# Patient Record
Sex: FEMALE | Race: WHITE | NOT HISPANIC OR LATINO | URBAN - METROPOLITAN AREA
[De-identification: names, ages, dates, MRNs, and addresses within clinical notes are randomized per-mention and may not be internally consistent; named-entity substitution may affect disease eponyms.]

---

## 2023-05-22 ENCOUNTER — OFFICE VISIT (OUTPATIENT)
Dept: OBSTETRICS AND GYNECOLOGY | Facility: CLINIC | Age: 43
End: 2023-05-22
Payer: COMMERCIAL

## 2023-05-22 VITALS — WEIGHT: 179 LBS | DIASTOLIC BLOOD PRESSURE: 87 MMHG | SYSTOLIC BLOOD PRESSURE: 134 MMHG

## 2023-05-22 DIAGNOSIS — Z01.419 WELL WOMAN EXAM WITH ROUTINE GYNECOLOGICAL EXAM: Primary | ICD-10-CM

## 2023-05-22 PROCEDURE — 99999 PR PBB SHADOW E&M-NEW PATIENT-LVL III: ICD-10-PCS | Mod: PBBFAC,,, | Performed by: OBSTETRICS & GYNECOLOGY

## 2023-05-22 PROCEDURE — 88175 CYTOPATH C/V AUTO FLUID REDO: CPT | Performed by: OBSTETRICS & GYNECOLOGY

## 2023-05-22 PROCEDURE — 99396 PREV VISIT EST AGE 40-64: CPT | Mod: S$GLB,,, | Performed by: OBSTETRICS & GYNECOLOGY

## 2023-05-22 PROCEDURE — 99396 PR PREVENTIVE VISIT,EST,40-64: ICD-10-PCS | Mod: S$GLB,,, | Performed by: OBSTETRICS & GYNECOLOGY

## 2023-05-22 PROCEDURE — 99999 PR PBB SHADOW E&M-NEW PATIENT-LVL III: CPT | Mod: PBBFAC,,, | Performed by: OBSTETRICS & GYNECOLOGY

## 2023-05-22 RX ORDER — TIRZEPATIDE 7.5 MG/.5ML
INJECTION, SOLUTION SUBCUTANEOUS
COMMUNITY
Start: 2023-02-08

## 2023-05-22 NOTE — PROGRESS NOTES
CC: Annual check-up    SUBJECTIVE:   43 y.o. female   for annual routine Pap and checkup. Patient's last menstrual period was 05/15/2023..  She has no unusual complaints.    Former pt of [facility] in [place]  Had ESSURE and abaltion at age 25  Cycles reg last 5 days but not heavy    History reviewed. No pertinent past medical history.  Past Surgical History:   Procedure Laterality Date    TUBAL LIGATION       Social History     Socioeconomic History    Marital status: Single   Tobacco Use    Smoking status: Never    Smokeless tobacco: Never   Substance and Sexual Activity    Alcohol use: Not Currently    Drug use: Never    Sexual activity: Yes     Partners: Male     History reviewed. No pertinent family history.  OB History    Para Term  AB Living   2 2 2         SAB IAB Ectopic Multiple Live Births                  # Outcome Date GA Lbr Jose F/2nd Weight Sex Delivery Anes PTL Lv   2 Term     M Vag-Spont      1 Term     M Vag-Spont            Current Outpatient Medications   Medication Sig Dispense Refill    MOUNJARO 7.5 mg/0.5 mL PnIj SMARTSI.5 Milligram(s) SUB-Q Once a Week       No current facility-administered medications for this visit.     Allergies: Patient has no known allergies.     ROS:  Constitutional: no weight loss, weight gain, fever, fatigue  Eyes:  No vision changes, glasses/contacts  ENT/Mouth: No ulcers, sinus problems, ears ringing, headache  Cardiovascular: No inability to lie flat, chest pain, exercise intolerance, swelling, heart palpitations  Respiratory: No wheezing, coughing blood, shortness of breath, or cough  Gastrointestinal: No diarrhea, bloody stool, nausea/vomiting, constipation, gas, hemorrhoids  Genitourinary: No blood in urine, painful urination, urgency of urination, frequency of urination, incomplete emptying, incontinence, abnormal bleeding, painful periods, heavy periods, vaginal discharge, vaginal odor, painful intercourse, sexual problems, bleeding after  intercourse.  Musculoskeletal: No muscle weakness  Skin/Breast: No painful breasts, nipple discharge, masses, rash, ulcers  Neurological: No passing out, seizures, numbness, headache  Endocrine: No diabetes, hypothyroid, hyperthyroid, hot flashes, hair loss, abnormal hair growth, ance  Psychiatric: No depression, crying  Hematologic: No bruises, bleeding, swollen lymph nodes, anemia.      OBJECTIVE:   The patient appears well, alert, oriented x 3, in no distress.  /87   Wt 81.2 kg (179 lb)   LMP 05/15/2023   NECK: no thyromegaly, trachea midline  SKIN: no acne, striae, hirsutism  BREAST EXAM: breasts appear normal, no suspicious masses, no skin or nipple changes or axillary nodes, symmetric fibrous changes in both upper outer quadrants, surgical scars noted from breast lift and abdomionoplasty  ABDOMEN: no hernias, masses, or hepatosplenomegaly  GENITALIA: normal external genitalia, no erythema, no discharge  URETHRA: normal urethra, normal urethral meatus  VAGINA: Normal  CERVIX: no lesions or cervical motion tenderness  UTERUS: normal  ADNEXA: normal adnexa and no mass, fullness, tenderness      ASSESSMENT:   well woman  1. Well woman exam with routine gynecological exam        PLAN:   mammogram  pap smear  return annually or prn  Orders Placed This Encounter    Mammo Digital Screening Bilat w/ [facility]    Liquid-Based Pap Smear, Screening

## 2023-05-25 LAB
FINAL PATHOLOGIC DIAGNOSIS: NORMAL
Lab: NORMAL

## 2024-10-29 ENCOUNTER — TELEPHONE (OUTPATIENT)
Dept: OBSTETRICS AND GYNECOLOGY | Facility: CLINIC | Age: 44
End: 2024-10-29
Payer: COMMERCIAL

## 2024-10-29 DIAGNOSIS — Z12.39 ENCOUNTER FOR SCREENING FOR MALIGNANT NEOPLASM OF BREAST, UNSPECIFIED SCREENING MODALITY: Primary | ICD-10-CM

## 2024-11-01 ENCOUNTER — OFFICE VISIT (OUTPATIENT)
Dept: OBSTETRICS AND GYNECOLOGY | Facility: CLINIC | Age: 44
End: 2024-11-01
Payer: COMMERCIAL

## 2024-11-01 VITALS — SYSTOLIC BLOOD PRESSURE: 119 MMHG | DIASTOLIC BLOOD PRESSURE: 83 MMHG | WEIGHT: 172.81 LBS

## 2024-11-01 DIAGNOSIS — Z30.09 COUNSELING FOR BIRTH CONTROL REGARDING INTRAUTERINE DEVICE (IUD): ICD-10-CM

## 2024-11-01 DIAGNOSIS — Z01.419 WELL WOMAN EXAM WITH ROUTINE GYNECOLOGICAL EXAM: Primary | ICD-10-CM

## 2024-11-01 DIAGNOSIS — N92.0 MENORRHAGIA WITH REGULAR CYCLE: ICD-10-CM

## 2024-11-01 PROCEDURE — 99999 PR PBB SHADOW E&M-EST. PATIENT-LVL III: CPT | Mod: PBBFAC,,, | Performed by: OBSTETRICS & GYNECOLOGY

## 2024-11-01 RX ORDER — IBUPROFEN 600 MG/1
600 TABLET ORAL EVERY 6 HOURS PRN
Qty: 60 TABLET | Refills: 2 | Status: SHIPPED | OUTPATIENT
Start: 2024-11-01 | End: 2025-11-01

## 2024-11-01 RX ORDER — ALPRAZOLAM 0.5 MG/1
0.5 TABLET ORAL ONCE
Qty: 1 TABLET | Refills: 0 | Status: SHIPPED | OUTPATIENT
Start: 2024-11-01 | End: 2024-11-01

## 2024-11-01 RX ORDER — OXYCODONE AND ACETAMINOPHEN 5; 325 MG/1; MG/1
1 TABLET ORAL ONCE
Qty: 1 TABLET | Refills: 0 | Status: SHIPPED | OUTPATIENT
Start: 2024-11-01 | End: 2024-11-01

## 2024-12-18 ENCOUNTER — TELEPHONE (OUTPATIENT)
Dept: OBSTETRICS AND GYNECOLOGY | Facility: CLINIC | Age: 44
End: 2024-12-18
Payer: COMMERCIAL

## 2024-12-18 NOTE — TELEPHONE ENCOUNTER
----- Message from Xiomara sent at 12/18/2024  1:03 PM CST -----  Type:  Needs Medical Advice/procedure question     Who Called: pt    Would the patient rather a call back or a response via [facility]? Call or portal  Best Call Back Number: [phone]  Additional Information: pt started cycle can she still come for procedure Friday if missed by call put in portal.

## 2024-12-18 NOTE — TELEPHONE ENCOUNTER
----- Message from Xiomara sent at 12/18/2024  1:03 PM CST -----  Type:  Needs Medical Advice/procedure question     Who Called: pt    Would the patient rather a call back or a response via [facility]? Call or portal  Best Call Back Number: [phone]  Additional Information: pt started cycle can she still come for procedure Friday if missed by call put in portal.

## 2024-12-20 ENCOUNTER — PROCEDURE VISIT (OUTPATIENT)
Dept: OBSTETRICS AND GYNECOLOGY | Facility: CLINIC | Age: 44
End: 2024-12-20
Payer: COMMERCIAL

## 2024-12-20 VITALS — SYSTOLIC BLOOD PRESSURE: 134 MMHG | WEIGHT: 173.06 LBS | DIASTOLIC BLOOD PRESSURE: 83 MMHG | BODY MASS INDEX: 28.8 KG/M2

## 2024-12-20 DIAGNOSIS — N92.0 MENORRHAGIA WITH REGULAR CYCLE: Primary | ICD-10-CM

## 2024-12-20 DIAGNOSIS — Z30.41 ENCOUNTER FOR SURVEILLANCE OF CONTRACEPTIVE PILLS: ICD-10-CM

## 2024-12-20 RX ORDER — NORETHINDRONE 0.35 MG/1
1 TABLET ORAL DAILY
Qty: 30 TABLET | Refills: 11 | Status: SHIPPED | OUTPATIENT
Start: 2024-12-20 | End: 2025-12-20

## 2024-12-20 NOTE — PROGRESS NOTES
Preop note   C/o heavy reg cycles and wants to try some hormonal option to stop cycles, interested in mirena, had an ablation already  Discussed options and wants to do office hysteroscopy and mirena insertion  Will schedule on 11/22 in [facility]  Verbal consents done will sign everything that day  Meds sent in              DATE OF PROCEDURE:  12/20/24     PREOPERATIVE DIAGNOSIS:  h/o abaltion, menorhagia, desires IUD     POSTOPERATIVE DIAGNOSIS:  same     SURGERY:  Office hysteroscope and IUD removal with Endosee.      SURGEON:  Omer Snider M.D.     ASSISTANT:  None.     ANESTHESIA:  oral sedation with valium and percocet     ESTIMATED BLOOD LOSS: 0 mL.     FINDINGS:  endometrial cavity obliterated from lower segment upwards. No cavity availabl;e for IUD placement     SPECIMENS:  none     COMPLICATIONS:  None.     OPERATIVE REPORT IN DETAIL:  After assuring informed consent, the patient  was   then placed in lithotomy position and then her vagina/cervix was prepped with betadine. .  A  speculum was placed in the   vagina.  The anterior lip of the cervix was grasped with a single-tooth   tenaculum and the cervix  was dilated with os finder.  The hysteroscope was inserted   with the findings noted above.  All instruments were removed.  The patient tolerated the   procedure well.  All counts were correct x2.  pt left with  w/o incident     Will start Micronor qd to lighten cycles, if vb still problematic discussed switchinh to lo dose combination ocp  Micronor sent in  Call for problems

## 2024-12-20 NOTE — OP NOTE
Preop note   C/o heavy reg cycles and wants to try some hormonal option to stop cycles, interested in mirena, had an ablation already  Discussed options and wants to do office hysteroscopy and mirena insertion  Will schedule on 11/22 in [facility]  Verbal consents done will sign everything that day  Meds sent in          DATE OF PROCEDURE:  12/20/24     PREOPERATIVE DIAGNOSIS:  h/o abaltion, menorhagia, desires IUD    POSTOPERATIVE DIAGNOSIS:  same     SURGERY:  Office hysteroscope and IUD removal with Endosee.      SURGEON:  Omer Snider M.D.     ASSISTANT:  None.     ANESTHESIA:  oral sedation with valium and percocet     ESTIMATED BLOOD LOSS: 0 mL.     FINDINGS:  endometrial cavity obliterated from lower segment upwards. No cavity availabl;e for IUD placement     SPECIMENS:  none     COMPLICATIONS:  None.     OPERATIVE REPORT IN DETAIL:  After assuring informed consent, the patient  was   then placed in lithotomy position and then her vagina/cervix was prepped with betadine. .  A  speculum was placed in the   vagina.  The anterior lip of the cervix was grasped with a single-tooth   tenaculum and the cervix  was dilated with os finder.  The hysteroscope was inserted   with the findings noted above.  All instruments were removed.  The patient tolerated the   procedure well.  All counts were correct x2.  pt left with  w/o incident    Will start Micronor qd to lighten cycles, if vb still problematic discussed switchinh to lo dose combination ocp  Micronor sent in  Call for problems

## 2025-01-17 ENCOUNTER — OFFICE VISIT (OUTPATIENT)
Dept: FAMILY MEDICINE | Facility: CLINIC | Age: 45
End: 2025-01-17
Payer: COMMERCIAL

## 2025-01-17 VITALS
BODY MASS INDEX: 28.47 KG/M2 | WEIGHT: 170.88 LBS | DIASTOLIC BLOOD PRESSURE: 80 MMHG | SYSTOLIC BLOOD PRESSURE: 120 MMHG | HEIGHT: 65 IN | HEART RATE: 74 BPM

## 2025-01-17 DIAGNOSIS — Z00.00 ANNUAL PHYSICAL EXAM: ICD-10-CM

## 2025-01-17 DIAGNOSIS — Z01.818 PRE-OP EVALUATION: ICD-10-CM

## 2025-01-17 DIAGNOSIS — F17.200 TOBACCO DEPENDENCE: ICD-10-CM

## 2025-01-17 DIAGNOSIS — Z23 NEED FOR VACCINATION: Primary | ICD-10-CM

## 2025-01-17 PROCEDURE — 99204 OFFICE O/P NEW MOD 45 MIN: CPT | Mod: 25,S$GLB,, | Performed by: STUDENT IN AN ORGANIZED HEALTH CARE EDUCATION/TRAINING PROGRAM

## 2025-01-17 PROCEDURE — 90715 TDAP VACCINE 7 YRS/> IM: CPT | Mod: S$GLB,,, | Performed by: STUDENT IN AN ORGANIZED HEALTH CARE EDUCATION/TRAINING PROGRAM

## 2025-01-17 PROCEDURE — 99999 PR PBB SHADOW E&M-EST. PATIENT-LVL IV: CPT | Mod: PBBFAC,,, | Performed by: STUDENT IN AN ORGANIZED HEALTH CARE EDUCATION/TRAINING PROGRAM

## 2025-01-17 PROCEDURE — 90471 IMMUNIZATION ADMIN: CPT | Mod: S$GLB,,, | Performed by: STUDENT IN AN ORGANIZED HEALTH CARE EDUCATION/TRAINING PROGRAM

## 2025-01-17 PROCEDURE — 93010 ELECTROCARDIOGRAM REPORT: CPT | Mod: S$GLB,,, | Performed by: INTERNAL MEDICINE

## 2025-01-17 PROCEDURE — 93005 ELECTROCARDIOGRAM TRACING: CPT | Mod: S$GLB,,, | Performed by: STUDENT IN AN ORGANIZED HEALTH CARE EDUCATION/TRAINING PROGRAM

## 2025-01-17 NOTE — ASSESSMENT & PLAN NOTE
Patient is medically optimized for surgery and anesthesia. There are no active medical issues requiring further intervention at this time. Patient will dc Mounjaro one month prior to surgery. She was encouraged to quit smoking at least one month prior to surgery. Good functional capacity (>4 METs). Normal EKG.   no

## 2025-01-17 NOTE — PROGRESS NOTES
Patient ID: Roland Sauer is a 44 y.o. female.    Chief Complaint: Establish Care and Pre-op Exam    History of Present Illness    CHIEF COMPLAINT:  Roland presents today to establish care and obtain pre-operative clearance for cosmetic surgery.    SURGICAL HISTORY:  She underwent cosmetic surgery and tubal ligation 18 years ago without complications.    PLANNED PROCEDURES:  She has scheduled multiple cosmetic procedures with Dr. Yu in [place], including breast implants, back lift, and pubic surgery.    MEDICATIONS:  She takes birth control for menstrual cycle management and Mounjaro (Tirzepatide) monthly for weight management maintenance, which she plans to discontinue prior to upcoming surgery.    FAMILY HISTORY:  She reports diabetes in both parents and sister, hypertension in father, and heart disease in male relatives on both sides of the family. She denies any family history of cancer.    SOCIAL HISTORY:  She began vaping 2 years ago with no prior smoking history. She reports minimal alcohol consumption of approximately 5 drinks per year.      ROS:  General: -fever, -chills, -fatigue, -weight gain, -weight loss  Eyes: -vision changes, -redness, -discharge  ENT: -ear pain, -nasal congestion, -sore throat  Cardiovascular: -chest pain, -palpitations, -lower extremity edema  Respiratory: -cough, -shortness of breath  Gastrointestinal: -abdominal pain, -nausea, -vomiting, -diarrhea, -constipation, -blood in stool  Genitourinary: -dysuria, -hematuria, -frequency  Musculoskeletal: -joint pain, -muscle pain  Skin: -rash, -lesion  Neurological: -headache, -dizziness, -numbness, -tingling  Psychiatric: -anxiety, -depression, -sleep difficulty         Physical Exam    General: No acute distress. Well-developed. Well-nourished.  Eyes: EOMI. Sclerae anicteric.  HENT: Normocephalic. Atraumatic. Nares patent. Moist oral mucosa.  Ears: Bilateral TMs clear. Bilateral EACs clear.  Cardiovascular: Regular rate. Regular rhythm.  No murmurs. No rubs. No gallops. Normal S1, S2.  Respiratory: Normal respiratory effort. Clear to auscultation bilaterally. No rales. No rhonchi. No wheezing.  Abdomen: Soft. Non-tender. Non-distended. Normoactive bowel sounds.  Musculoskeletal: No  obvious deformity.  Extremities: No lower extremity edema.  Neurological: Alert & oriented x3. No slurred speech. Normal gait.  Psychiatric: Normal mood. Normal affect. Good insight. Good judgment.  Skin: Warm. Dry. No rash.         Assessment & Plan    MENSTRUAL CYCLE MANAGEMENT:  - Noted that the patient underwent tubal ligation approximately 18 years ago.  - Prescribed birth control to be taken once a month for menstrual cycle management, not for pregnancy prevention.  - Continued birth control, taken once monthly for cycle management.    DIABETES RISK:  - Ordered A1C test.  - Documented family history of diabetes in both parents and sister.    CARDIOVASCULAR HEALTH:  - Evaluated cardiovascular risk factors and functional capacity.  - Ordered EKG.  - Documented family history of heart disease in men from both sides of the family and hypertension in father.    SMOKING CESSATION:  - Instructed the patient to discontinue vaping at least 1 month before surgery.  - Noted that the patient has been vaping for approximately 2 years and is attempting to quit.  - Acknowledged patient's efforts to quit vaping and offered smoking cessation program.  - Roland to discontinue vaping at least 1 month before surgery.    WEIGHT MANAGEMENT:  - Continued Mounjaro (tirzepatide) for 1 more week, then instructed to discontinue at least 1 week before surgery.  - Noted that the patient has been taking Mounjaro (tirzepatide) weekly for weight management.  - Evaluated patient's report of weight loss with Mounjaro.  - Discussed pre-operative management of Mounjaro with the patient.  - Roland to stop taking Mounjaro (tirzepatide) at least 1 week before surgery.       1. Need for  vaccination  Assessment & Plan:  Declined flu and PCV vaccines  Agreed to take Tdap vaccine    Orders:  -     Tdap vaccine injection 0.5 mL    2. Annual physical exam  -     CBC Auto Differential; Future; Expected date: 01/17/2026  -     Comprehensive Metabolic Panel; Future; Expected date: 01/17/2026  -     Lipid Panel; Future; Expected date: 01/17/2026  -     TSH; Future; Expected date: 01/17/2026    3. Pre-op evaluation  Assessment & Plan:  Patient is medically optimized for surgery and anesthesia. There are no active medical issues requiring further intervention at this time. Patient will dc Mounjaro one month prior to surgery. She was encouraged to quit smoking at least one month prior to surgery. Good functional capacity (>4 METs). Normal EKG.    Orders:  -     Comprehensive Metabolic Panel; Future; Expected date: 01/17/2025  -     Hemoglobin A1C; Future; Expected date: 01/17/2025  -     Lipid Panel; Future; Expected date: 01/17/2025  -     Hepatitis C Antibody; Future; Expected date: 01/17/2025  -     HIV 1/2 Ag/Ab (4th Gen); Future; Expected date: 01/17/2025  -     CBC Auto Differential; Future; Expected date: 01/17/2025  -     Protime-INR; Future; Expected date: 01/17/2025  -     APTT; Future; Expected date: 01/17/2025  -     Cancel: Pregnancy, urine rapid  -     Urinalysis, Reflex to Urine Culture; Future; Expected date: 01/17/2025  -     IN OFFICE EKG 12-LEAD (to Muse)  -     X-Ray Chest PA And Lateral; Future; Expected date: 01/17/2025  -     Pregnancy, urine rapid; Future; Expected date: 01/17/2025    4. Tobacco dependence  Assessment & Plan:  The patient was counseled on the dangers of tobacco use, and was advised to quit and referred to a tobacco cessation program.  Reviewed strategies to maximize success, including removing cigarettes and smoking materials from environment and local smoking cessation programs (Spent 5 min counseling).     Orders:  -     Ambulatory referral/consult to Smoking  Cessation Program; Future; Expected date: 01/24/2025              No follow-ups on file.    This note was generated with the assistance of ambient listening technology. Verbal consent was obtained by the patient and accompanying visitor(s) for the recording of patient appointment to facilitate this note. I attest to having reviewed and edited the generated note for accuracy, though some syntax or spelling errors may persist. Please contact the author of this note for any clarification.     show

## 2025-01-17 NOTE — ASSESSMENT & PLAN NOTE
The patient was counseled on the dangers of tobacco use, and was advised to quit and referred to a tobacco cessation program.  Reviewed strategies to maximize success, including removing cigarettes and smoking materials from environment and local smoking cessation programs (Spent 5 min counseling).

## 2025-01-18 LAB
OHS QRS DURATION: 74 MS
OHS QTC CALCULATION: 421 MS

## 2025-01-21 ENCOUNTER — PATIENT MESSAGE (OUTPATIENT)
Dept: FAMILY MEDICINE | Facility: CLINIC | Age: 45
End: 2025-01-21
Payer: COMMERCIAL

## 2025-01-24 ENCOUNTER — PATIENT MESSAGE (OUTPATIENT)
Dept: FAMILY MEDICINE | Facility: CLINIC | Age: 45
End: 2025-01-24
Payer: COMMERCIAL

## 2025-01-27 ENCOUNTER — PATIENT MESSAGE (OUTPATIENT)
Dept: FAMILY MEDICINE | Facility: CLINIC | Age: 45
End: 2025-01-27
Payer: COMMERCIAL

## 2025-03-31 ENCOUNTER — E-VISIT (OUTPATIENT)
Dept: OBSTETRICS AND GYNECOLOGY | Facility: CLINIC | Age: 45
End: 2025-03-31
Payer: COMMERCIAL

## 2025-03-31 DIAGNOSIS — Z30.45 ENCOUNTER FOR SURVEILLANCE OF TRANSDERMAL PATCH HORMONAL CONTRACEPTIVE DEVICE: ICD-10-CM

## 2025-03-31 DIAGNOSIS — N92.6 MENSES, IRREGULAR: Primary | ICD-10-CM

## 2025-03-31 RX ORDER — NORELGESTROMIN AND ETHINYL ESTRADIOL 35; 150 UG/MG; UG/MG
1 PATCH TRANSDERMAL
Qty: 4 PATCH | Refills: 11 | Status: SHIPPED | OUTPATIENT
Start: 2025-03-31 | End: 2026-03-31

## 2025-03-31 NOTE — PROGRESS NOTES
Patient ID: Roland Sauer is a 44 y.o. female.    Chief Complaint: General Illness (Entered automatically based on patient selection in [employer].)    The patient initiated a request through [employer] on 3/31/2025 for evaluation and management with a chief complaint of General Illness (Entered automatically based on patient selection in [employer].)     I evaluated the questionnaire submission on 3/31/25.    [facility]    3/31/2025 10:16 AM CDT - Filed by Patient   What do you need help with? Medication Management   Do you agree to participate in an E-Visit? Yes   If you have any of the following symptoms, please present to your local emergency room or call 911:  I acknowledge   Medication requests for narcotics will not be addressed via an E-Visit.  Please schedule an appointment. I acknowledge   Do you have any of the following pregnancy-related conditions? None   Do you want to address a new or existing medication? I would like to address a medication I currently take   What is the main issue you would like addressed today? Finding a different birth control   Would you like to change or continue your medication? Change medication   What medication would you like changed?  Birth control   What is your current dose? One pill daily   How often do you take your medication? 1 time daily   Why do you need the medication changed? Medication does not work   What effect has your medication had on your problem? Less than expected    What medical condition is the  medication intended to treat? Getting rid of my cycle   Provide any additional information you feel is important. The pill isnt working as i continue to have a cycle even when taking it. This may partially be to me being on Mounjaro and my body not fully absorbing the medication. I think I would like to try the patch.   Please attach any relevant images or files    Are you able to take your vital signs? No         Encounter Diagnoses   Name  Primary?    Menses, irregular Yes    Encounter for surveillance of transdermal patch hormonal contraceptive device         No orders of the defined types were placed in this encounter.     Medications Ordered This Encounter   Medications    norelgestromin-ethinyl estradiol 150-35 mcg/24 hr     Sig: Place 1 patch onto the skin every 7 days.     Dispense:  4 patch     Refill:  11      Called pt and says she quit vapping 9 wks ago in prep for cosmetic surgery and doesn't intend to resume the habit  Rx for evra sent in  No follow-ups on file.      E-Visit Time Tracking:    Day 1 Time (in minutes): 11    Total Time (in minutes): 11

## 2025-08-11 ENCOUNTER — E-VISIT (OUTPATIENT)
Dept: URGENT CARE | Facility: CLINIC | Age: 45
End: 2025-08-11
Payer: COMMERCIAL

## 2025-08-11 DIAGNOSIS — N76.0 ACUTE VAGINITIS: Primary | ICD-10-CM

## 2025-08-11 RX ORDER — METRONIDAZOLE 500 MG/1
500 TABLET ORAL EVERY 12 HOURS
Qty: 14 TABLET | Refills: 0 | Status: SHIPPED | OUTPATIENT
Start: 2025-08-11 | End: 2025-08-18

## 2025-08-11 RX ORDER — FLUCONAZOLE 150 MG/1
150 TABLET ORAL DAILY
Qty: 1 TABLET | Refills: 1 | Status: SHIPPED | OUTPATIENT
Start: 2025-08-11 | End: 2025-08-12